# Patient Record
Sex: MALE | Race: WHITE
[De-identification: names, ages, dates, MRNs, and addresses within clinical notes are randomized per-mention and may not be internally consistent; named-entity substitution may affect disease eponyms.]

---

## 2022-07-23 ENCOUNTER — HOSPITAL ENCOUNTER (EMERGENCY)
Dept: HOSPITAL 4 - SED | Age: 52
Discharge: HOME | End: 2022-07-23
Payer: COMMERCIAL

## 2022-07-23 VITALS — HEIGHT: 70 IN | BODY MASS INDEX: 27.2 KG/M2 | WEIGHT: 190 LBS

## 2022-07-23 VITALS — SYSTOLIC BLOOD PRESSURE: 146 MMHG

## 2022-07-23 DIAGNOSIS — Z79.899: ICD-10-CM

## 2022-07-23 DIAGNOSIS — M79.671: Primary | ICD-10-CM

## 2022-07-23 PROCEDURE — 99283 EMERGENCY DEPT VISIT LOW MDM: CPT

## 2022-07-23 PROCEDURE — 96372 THER/PROPH/DIAG INJ SC/IM: CPT

## 2022-07-23 NOTE — NUR
splint removed by myself. dr paz at [facility] for obs and eval. dorsalis pedis and 
post tib. pulse palp and even. drsg and splint reapplied. pending dc after wait 
for pt ortho to return call. pt verbalizes decrease in pain. medicated by 
lakshmi johnson.

## 2022-07-23 NOTE — NUR
pt states had bunionectomy at standing surgical site. dc'd w/o incident.  
developed severe rt foot pain late friday into saturday morning.  pain 
became unbearable and his surgeon did not answer multiple calls made. he states 
he was instructed to go to ed for any issues.